# Patient Record
Sex: FEMALE | Race: OTHER | NOT HISPANIC OR LATINO
[De-identification: names, ages, dates, MRNs, and addresses within clinical notes are randomized per-mention and may not be internally consistent; named-entity substitution may affect disease eponyms.]

---

## 2023-08-09 ENCOUNTER — NON-APPOINTMENT (OUTPATIENT)
Age: 33
End: 2023-08-09

## 2023-10-30 ENCOUNTER — EMERGENCY (EMERGENCY)
Facility: HOSPITAL | Age: 33
LOS: 1 days | Discharge: HOME | End: 2023-10-30
Attending: STUDENT IN AN ORGANIZED HEALTH CARE EDUCATION/TRAINING PROGRAM
Payer: COMMERCIAL

## 2023-10-30 VITALS
DIASTOLIC BLOOD PRESSURE: 79 MMHG | WEIGHT: 125 LBS | HEIGHT: 65 IN | SYSTOLIC BLOOD PRESSURE: 118 MMHG | OXYGEN SATURATION: 99 % | RESPIRATION RATE: 18 BRPM | TEMPERATURE: 98 F | HEART RATE: 78 BPM

## 2023-10-30 LAB
RAPID RVP RESULT: SIGNIFICANT CHANGE UP
RAPID RVP RESULT: SIGNIFICANT CHANGE UP
SARS-COV-2 RNA SPEC QL NAA+PROBE: SIGNIFICANT CHANGE UP
SARS-COV-2 RNA SPEC QL NAA+PROBE: SIGNIFICANT CHANGE UP

## 2023-10-30 PROCEDURE — 99284 EMERGENCY DEPT VISIT MOD MDM: CPT

## 2023-10-30 PROCEDURE — 99283 EMERGENCY DEPT VISIT LOW MDM: CPT

## 2023-10-30 PROCEDURE — 0225U NFCT DS DNA&RNA 21 SARSCOV2: CPT

## 2023-10-30 RX ORDER — POLYMYXIN B SULF/TRIMETHOPRIM 10000-1/ML
1 DROPS OPHTHALMIC (EYE)
Qty: 1 | Refills: 0
Start: 2023-10-30 | End: 2023-11-05

## 2023-10-30 NOTE — ED PROVIDER NOTE - NSDCPRINTRESULTS_ED_ALL_ED
Relevant Problems   No relevant active problems       Anesthetic History   No history of anesthetic complications            Review of Systems / Medical History  Patient summary reviewed, nursing notes reviewed and pertinent labs reviewed    Pulmonary          Smoker      Comments: Pulmonary nodule, right (R91.1)   Neuro/Psych             Comments: Peripheral neuropathy (G62.9) Cardiovascular    Hypertension          Hyperlipidemia    Exercise tolerance: >4 METS     GI/Hepatic/Renal     GERD: well controlled           Endo/Other  Within defined limits           Other Findings              Physical Exam    Airway  Mallampati: I  TM Distance: > 6 cm  Neck ROM: normal range of motion   Mouth opening: Normal     Cardiovascular  Regular rate and rhythm,  S1 and S2 normal,  no murmur, click, rub, or gallop  Rhythm: regular  Rate: normal         Dental    Dentition: Caps/crowns     Pulmonary  Breath sounds clear to auscultation               Abdominal  GI exam deferred       Other Findings            Anesthetic Plan    ASA: 2  Anesthesia type: general          Induction: Intravenous  Anesthetic plan and risks discussed with: Patient
Patient requests all Lab, Cardiology, and Radiology Results on their Discharge Instructions

## 2023-10-30 NOTE — ED PROVIDER NOTE - OBJECTIVE STATEMENT
33-year-old female no medical hx presenting with bilateral eye redness for the past day. Notes that her son was sick for the past few days with a viral infection. He coughed in her face last night and this morning she woke up with bilateral red eyes, with yellow discharge from left eye. Denies visual changes. Does not wear contact lenses. Denies fevers, chills, or any other symptoms.

## 2023-10-30 NOTE — ED PROVIDER NOTE - PATIENT PORTAL LINK FT
You can access the FollowMyHealth Patient Portal offered by [facility] by registering at the following website: [email or web address]. By joining [facility]’s FollowMyHealth portal, you will also be able to view your health information using other applications (apps) compatible with our system.

## 2023-10-30 NOTE — ED ADULT TRIAGE NOTE - TEMPERATURE IN FAHRENHEIT (DEGREES F)
1105: Report received from Bryan louise, assumed care of patient at this time. 1230: Spoke with  regarding POC, verbal orders to remove cervidil at 0120, perform SVE, if dilated may begin pitocin, if not much change in cervix orders for 25mcg cytotec buccal Q4H.     0120: Cervidil removed, SVE 0//-3 by this nurse. Plan to start cytotec.    0715: Bedside shift change report given to MICHELLE Ramon RN (oncoming nurse) by STONE Dalton RN (offgoing nurse). Report included the following information SBAR. 98.4

## 2023-10-30 NOTE — ED PROVIDER NOTE - CLINICAL SUMMARY MEDICAL DECISION MAKING FREE TEXT BOX
33-year-old female no medical hx presenting with bilateral eye redness for the past day. RVP sent. Will send rx for polytrim eye drops although suspect that this is likley viral conjunctivitis. Hand hygiene recommendations provided.

## 2023-10-30 NOTE — ED PROVIDER NOTE - NSFOLLOWUPINSTRUCTIONS_ED_ALL_ED_FT
You were seen in the emergency department for: eye redness  Your diagnosis for this visit was: conjunctivitis, likely viral  From this ED visit you were prescribed: polytrim eye drops  We recommend you follow up with: your primary care doctor    Please return to the Emergency Department if you experience any of the following symptoms:   - Shortness of breath or trouble breathing  - Pressure, pain or tightness in the chest  - Face drooping, arm weakness or speech difficulty  - Persistence of severe vomiting  - Head injury or loss of consciousness  - Nonstop bleeding or an open wound    (1) Follow up with your primary care physician within the next 24-48 hours as discussed. In addition, we did not find evidence of a life threatening illness on your testing here today, but listed below are the specialists that will be necessary to see as an outpatient to continue the workup.  Please call the numbers listed below or [phone] to set up the necessary appointments.  (2) Take Tylenol (up to 1000mg or 1 g)  and/or Motrin (up to 600mg) up to every 6 hours as needed for pain.   (3) If you had an IV (intravenous) line placed, it was removed. Sometimes, after IV removal, that area can be tender for a few days; if it develops redness and swelling, those could be signs of infection; in which case, return to the Emergency Department for assessment.  (4) Please continue taking all of your home medications as directed.

## 2023-10-30 NOTE — ED ADULT NURSE NOTE - ISOLATION TYPE:
Discharge instructions including 1 prescription reviewed with pt. Pt verbalized understanding. VSS, PIV removed. Pt ambulatory to exit in stable condition.      Katalina Jackson RN  09/22/22 [id number] None

## 2023-10-30 NOTE — ED PROVIDER NOTE - ENMT, MLM
Airway patent, Nasal mucosa clear. Mouth with normal mucosa. Throat has no vesicles, no oropharyngeal exudates and uvula is midline. +bilateral conjunctival injection

## 2023-10-30 NOTE — ED PROVIDER NOTE - HIV OFFER
Hip joint injection can be repeated after 3- 4 months time and she will need to be seen in the office prior to procedure for re-evaluation and updated plan of care. Opt out

## 2023-11-07 ENCOUNTER — EMERGENCY (EMERGENCY)
Facility: HOSPITAL | Age: 33
LOS: 1 days | Discharge: HOME | End: 2023-11-07
Attending: EMERGENCY MEDICINE
Payer: COMMERCIAL

## 2023-11-07 VITALS
HEIGHT: 65 IN | WEIGHT: 123.9 LBS | RESPIRATION RATE: 18 BRPM | TEMPERATURE: 98 F | SYSTOLIC BLOOD PRESSURE: 102 MMHG | DIASTOLIC BLOOD PRESSURE: 73 MMHG | HEART RATE: 89 BPM | OXYGEN SATURATION: 99 %

## 2023-11-07 LAB
RAPID RVP RESULT: DETECTED
RAPID RVP RESULT: DETECTED
RV+EV RNA SPEC QL NAA+PROBE: DETECTED
RV+EV RNA SPEC QL NAA+PROBE: DETECTED
SARS-COV-2 RNA SPEC QL NAA+PROBE: SIGNIFICANT CHANGE UP
SARS-COV-2 RNA SPEC QL NAA+PROBE: SIGNIFICANT CHANGE UP

## 2023-11-07 PROCEDURE — 99283 EMERGENCY DEPT VISIT LOW MDM: CPT

## 2023-11-07 PROCEDURE — 99284 EMERGENCY DEPT VISIT MOD MDM: CPT

## 2023-11-07 PROCEDURE — 0225U NFCT DS DNA&RNA 21 SARSCOV2: CPT

## 2023-11-07 RX ORDER — CYCLOBENZAPRINE HYDROCHLORIDE 10 MG/1
5 TABLET, FILM COATED ORAL ONCE
Refills: 0 | Status: COMPLETED | OUTPATIENT
Start: 2023-11-07 | End: 2023-11-07

## 2023-11-07 RX ORDER — CYCLOBENZAPRINE HYDROCHLORIDE 10 MG/1
1 TABLET, FILM COATED ORAL
Qty: 9 | Refills: 0
Start: 2023-11-07 | End: 2023-11-09

## 2023-11-07 RX ADMIN — CYCLOBENZAPRINE HYDROCHLORIDE 5 MILLIGRAM(S): 10 TABLET, FILM COATED ORAL at 20:58

## 2023-11-07 NOTE — ED PROVIDER NOTE - PATIENT PORTAL LINK FT
You can access the FollowMyHealth Patient Portal offered by [facility] by registering at the following website: [email or web address]. By joining [facility]’s FollowMyHealth portal, you will also be able to view your health information using other applications (apps) compatible with our system.

## 2023-11-07 NOTE — ED PROVIDER NOTE - NSFOLLOWUPINSTRUCTIONS_ED_ALL_ED_FT
Please call your primary doctor to inform them of this ER visit and obtain the next available appointment within the next 5 days. As we discussed, return to the ER if you have any worsening symptoms. - Bring copies of your results.    We no longer feel that you need further emergency care or admission to the hospital at this time.    While we have determined that you are currently stable for discharge, we know that things can change. Please seek immediate medical attention or return to the ER if you experience any of the following:  Any worsening or persistent symptoms  Severe Pain  Chest Pain  Difficulty Breathing  Bleeding  Passing Out  Severe Rash  Inability to Eat or Drink  Persistent Fever    Please see a primary care doctor or specialist within 5 days to ensure that you are improving.    Please call the [facility] phone numbers on this document if you have any problems obtaining a follow up appointment.

## 2023-11-07 NOTE — ED PROVIDER NOTE - OBJECTIVE STATEMENT
33 year old female with a PHMx of  presents for left TMJ area pain and congestion. Patient states congestion has been for 2 days and pain by TMJ stated today. Patient also states she has pain to left side of neck area to lymph nodes which got better after taking Advil cold and sinus. No fevers, no chills, no chest pain, no shortness of breath, no dizziness, no headache, no visual changes. NKDA.

## 2023-11-07 NOTE — ED PROVIDER NOTE - ATTENDING APP SHARED VISIT CONTRIBUTION OF CARE
seen with acp  c/o head congestion and localized tenderness over jaw  pe positive left tmj tenderness  rvp is wnl  likely tmj syndrome  will start on nsaid  agree with acps assessment hx and physical and disposition

## 2023-11-07 NOTE — ED PROVIDER NOTE - NSICDXPASTMEDICALHX_GEN_ALL_CORE_FT
Problem: Restraint for Non-Violent/Non-Self-Destructive Behavior  Goal: Prevent/Manage Potential Problems  Maintain safety of patient and others during period of restraint.  Promote psychological and physical wellbeing.  Prevent injury to skin and involved body parts.  Promote nutrition, hydration, and elimination.  Outcome: No Change  D/I:  Vented and sedated on propofol.  Did not follow commands or nod head to question, patient also has a history of pulling out lines/tubes per patient's wife, so wrist restraints were applied last night in addition to the unsecured mitts that were already on. Reoriented frequently.  Reassurance given frequently.  A:  Due to high risk for self extubation restraints are still necessary.  P: Continue to monitor need for restraints.  See flowsheets for details.       PAST MEDICAL HISTORY:  H/O:

## 2023-11-07 NOTE — ED ADULT NURSE NOTE - NS ED NURSE LEVEL OF CONSCIOUSNESS MENTAL STATUS
Nutrition Care Plan    Nutrition Diagnosis:   Inadequate intake related to poor appetite and abdominal pain with inability to meet increased nutrient needs for healing of wound to LLE s/p multiple recent, extensive debridements and skin graft as evidenced by estimated nutrient needs to support healing and pt consuming % of mostly small meals since advancement to solid diet 8/17.      Intervention:  General/healthful diet:    Recommend to continue Consistent Carbohydrate Moderate, 2 Gram Sodium diet at this time.  If pt consistently consuming 50% or less of meals, recommend to liberalize diet by eliminating 2 Gram Sodium restriction.    --Staff to encourage meal intakes of >50% as tolerated and encourage pt to consume at least one protein-rich food at each meal.     Commercial beverage:    Per discussion with wife, will start trial of 4oz nutritional shake TID with meals.   --Staff to encourage intake and offer with medications when appropriate.      Monitoring and Evaluation:  Amount of food:    Goal is for pt to consistently consume/tolerate >50% of most meals and >75% of most oral supplements.    --Per wife, pt ate 50% of banana and 100% of an egg and pudding so far today.  Intakes of % recorded at two meals yesterday.         Awake/Alert/Cooperative

## 2023-11-07 NOTE — ED PROVIDER NOTE - CLINICAL SUMMARY MEDICAL DECISION MAKING FREE TEXT BOX
33 year old female with congestion and pain by TMJ, will do RVP. Patient will follow-up with PCP. Return precautions provided.

## 2023-11-07 NOTE — ED ADULT NURSE NOTE - NSFALLUNIVINTERV_ED_ALL_ED
Call bell, personal items and telephone in reach/Instruct patient to call for assistance before getting out of bed/chair/stretcher/Non-slip footwear applied when patient is off stretcher/[place] to call system/Physically safe environment - no spills, clutter or unnecessary equipment/Purposeful proactive rounding/Room/bathroom lighting operational, light cord in reach

## 2023-11-07 NOTE — ED PROVIDER NOTE - CHPI ED SYMPTOMS NEG
no chest pain, no shortness of breath, no dizziness, no headache, no visual changes/no fever/no chills

## 2024-02-12 PROBLEM — Z98.891 HISTORY OF UTERINE SCAR FROM PREVIOUS SURGERY: Chronic | Status: ACTIVE | Noted: 2023-11-07

## 2024-02-13 ENCOUNTER — OUTPATIENT (OUTPATIENT)
Dept: OUTPATIENT SERVICES | Facility: HOSPITAL | Age: 34
LOS: 1 days | End: 2024-02-13
Payer: COMMERCIAL

## 2024-02-13 ENCOUNTER — APPOINTMENT (OUTPATIENT)
Dept: MRI IMAGING | Facility: HOSPITAL | Age: 34
End: 2024-02-13
Payer: COMMERCIAL

## 2024-02-13 DIAGNOSIS — G44.52 NEW DAILY PERSISTENT HEADACHE (NDPH): ICD-10-CM

## 2024-02-13 PROCEDURE — 70551 MRI BRAIN STEM W/O DYE: CPT | Mod: 26

## 2024-02-13 PROCEDURE — 70551 MRI BRAIN STEM W/O DYE: CPT

## 2024-09-02 ENCOUNTER — EMERGENCY (EMERGENCY)
Facility: HOSPITAL | Age: 34
LOS: 1 days | Discharge: HOME | End: 2024-09-02
Attending: EMERGENCY MEDICINE
Payer: COMMERCIAL

## 2024-09-02 VITALS
DIASTOLIC BLOOD PRESSURE: 80 MMHG | OXYGEN SATURATION: 98 % | SYSTOLIC BLOOD PRESSURE: 125 MMHG | RESPIRATION RATE: 17 BRPM | TEMPERATURE: 100 F | HEART RATE: 90 BPM | WEIGHT: 123.46 LBS

## 2024-09-02 LAB — SARS-COV-2 RNA SPEC QL NAA+PROBE: SIGNIFICANT CHANGE UP

## 2024-09-02 PROCEDURE — 99283 EMERGENCY DEPT VISIT LOW MDM: CPT

## 2024-09-02 PROCEDURE — 87635 SARS-COV-2 COVID-19 AMP PRB: CPT

## 2024-09-02 RX ORDER — IBUPROFEN 600 MG
400 TABLET ORAL ONCE
Refills: 0 | Status: COMPLETED | OUTPATIENT
Start: 2024-09-02 | End: 2024-09-02

## 2024-09-02 RX ADMIN — Medication 400 MILLIGRAM(S): at 20:44

## 2024-09-02 NOTE — ED PROVIDER NOTE - OBJECTIVE STATEMENT
34-year-old female who presents with sore throat, muscle aches, chills that began today.  + COVID contact.

## 2024-09-02 NOTE — ED ADULT NURSE NOTE - OBJECTIVE STATEMENT
Patient presented to the ED complaining of nausea, body aches, chills and cold symptoms since today and wants covid test.

## 2024-09-02 NOTE — ED ADULT NURSE NOTE - NSFALLUNIVINTERV_ED_ALL_ED
Bed/Stretcher in lowest position, wheels locked, appropriate side rails in place/Call bell, personal items and telephone in reach/Instruct patient to call for assistance before getting out of bed/chair/stretcher/Non-slip footwear applied when patient is off stretcher/[place] to call system/Physically safe environment - no spills, clutter or unnecessary equipment/Purposeful proactive rounding/Room/bathroom lighting operational, light cord in reach

## 2024-09-02 NOTE — ED PROVIDER NOTE - CLINICAL SUMMARY MEDICAL DECISION MAKING FREE TEXT BOX
34-year-old female who presented with sore throat, myalgias, chills that began today.    Exam as above.  Positive COVID contacts.  COVID test negative.
Continue Regimen: Clindamycin & Benzo Peroxide 1.2/5% Gel QAM
Detail Level: Zone
Otc Regimen: Eucerin hand cream
Continue Regimen: Triamcinolone

## 2024-09-02 NOTE — ED PROVIDER NOTE - PHYSICAL EXAMINATION
general: Patient well appearing, vital signs within normal limits  HEENT: MMM, trachea midline, uvula midline, no pharyngeal erythema/edema/exudate  Respiratory: No respiratory distress  Neuro: Moves all extremities  Skin: No rashes or lesions

## 2024-09-02 NOTE — ED PROVIDER NOTE - PATIENT PORTAL LINK FT
You can access the FollowMyHealth Patient Portal offered by [facility] by registering at the following website: [email or web address]. By joining [facility]’s FollowMyHealth portal, you will also be able to view your health information using other applications (apps) compatible with our system.

## 2024-10-18 PROBLEM — Z00.00 ENCOUNTER FOR PREVENTIVE HEALTH EXAMINATION: Status: ACTIVE | Noted: 2024-10-18

## 2024-10-19 ENCOUNTER — APPOINTMENT (OUTPATIENT)
Dept: ULTRASOUND IMAGING | Facility: CLINIC | Age: 34
End: 2024-10-19

## 2025-03-23 ENCOUNTER — NON-APPOINTMENT (OUTPATIENT)
Age: 35
End: 2025-03-23

## 2025-03-28 ENCOUNTER — EMERGENCY (EMERGENCY)
Facility: HOSPITAL | Age: 35
LOS: 1 days | Discharge: HOME | End: 2025-03-28
Attending: STUDENT IN AN ORGANIZED HEALTH CARE EDUCATION/TRAINING PROGRAM
Payer: COMMERCIAL

## 2025-03-28 VITALS
SYSTOLIC BLOOD PRESSURE: 132 MMHG | HEIGHT: 65 IN | TEMPERATURE: 98 F | DIASTOLIC BLOOD PRESSURE: 75 MMHG | OXYGEN SATURATION: 99 % | WEIGHT: 130.73 LBS | RESPIRATION RATE: 16 BRPM | HEART RATE: 84 BPM

## 2025-03-28 PROCEDURE — 99283 EMERGENCY DEPT VISIT LOW MDM: CPT

## 2025-03-28 RX ORDER — CEFUROXIME SODIUM 1.5 G
1 VIAL (EA) INJECTION
Qty: 14 | Refills: 0
Start: 2025-03-28 | End: 2025-04-03

## 2025-03-28 RX ORDER — FLUCONAZOLE 150 MG
1 TABLET ORAL
Qty: 2 | Refills: 0
Start: 2025-03-28 | End: 2025-03-29

## 2025-03-28 NOTE — ED PROVIDER NOTE - CLINICAL SUMMARY MEDICAL DECISION MAKING FREE TEXT BOX
34-year-old female with a past medical history of  presents with dysuria.  Patient reports that on  she had dysuria and urgency.  She was prescribed Cipro without urinalysis or urine culture being performed.  Patient reports her symptoms initially improved but then on  she began having urinary frequency and dysuria again.  Followed up with urgent care where they started her on Macrobid.  Patient received a phone call from them today saying she tested positive for E. coli and group B strep and that the Macrobid would not treat her UTI.  They recommended that since she still is having dysuria she should  go to the emergency room for antibiotics.  Patient denies fevers, flank pain.    Reviewed urine culture from urgent care.  Patient sensitive to cefuroxime mean.  Will send to the pharmacy.  Will also send Diflucan in case she develops a vaginal yeast infection from so many different antibiotics.

## 2025-03-28 NOTE — ED PROVIDER NOTE - ATTENDING APP SHARED VISIT CONTRIBUTION OF CARE
Patient presented dysuria started on Macrobid otherwise abdomen soft no peritoneal patient is clinically well does not appear septic no flank pain to suggest kidney stone   will change antibiotics instructed PMD follow-up return precautions

## 2025-03-28 NOTE — ED PROVIDER NOTE - PATIENT PORTAL LINK FT
You can access the FollowMyHealth Patient Portal offered by [facility] by registering at the following website: [email or web address]. By joining [facility]’s FollowMyHealth portal, you will also be able to view your health information using other applications (apps) compatible with our system.

## 2025-03-28 NOTE — ED PROVIDER NOTE - OBJECTIVE STATEMENT
34-year-old female with a past medical history of  presents with dysuria.  Patient reports that on  she had dysuria and urgency.  She was prescribed Cipro without urinalysis or urine culture being performed.  Patient reports her symptoms initially improved but then on  she began having urinary frequency and dysuria again.  Followed up with urgent care where they started her on Macrobid.  Patient received a phone call from them today saying she tested positive for E. coli and group B strep and that the Macrobid would not treat her UTI.  They recommended that since she still is having dysuria she should  go to the emergency room for antibiotics.  Patient denies fevers, flank pain.

## 2025-03-28 NOTE — ED PROVIDER NOTE - NSFOLLOWUPINSTRUCTIONS_ED_ALL_ED_FT
Follow-up with your primary care doctor in 1 week.    Antibiotics sent to the pharmacy for you.  Take as directed and until all the medication is completed, even if you are feeling better.    If you develop symptoms of a vaginal yeast  infection such as vaginal itching, copious white discharge you can take the Diflucan.  You take 1 pill and if you continue to have symptoms 72 hours later you can take the second pill.    You can take Motrin (ibuprofen) 600 mg every 6 hours or Tylenol (acetaminophen) 1000 mg every 6 hours as needed for pain or fever.     If you experience any new or worsening symptoms or if you are concerned you can always come back to the emergency for a re-evaluation.

## 2025-03-28 NOTE — ED ADULT NURSE NOTE - OBJECTIVE STATEMENT
Patient presents to ED with c/o urinary symptoms for about 2 weeks and was started on Cipro. Patient also endorsed on 3/28 she started having urinary urgency and burning with urination, was seen at urgent care and was prescribed Macrobid, yesterday she was informed that her urine culture was tested positive for E-coli and referred to the ED for antibiotic. Patient denies abdominal pain and nausea and vomiting. No distress noted.

## 2025-03-28 NOTE — ED ADULT NURSE NOTE - CAS DISCH CONDITION
Chief complaint:   Chief Complaint   Patient presents with   • Pre-Op Exam       Vitals:  Visit Vitals  /68   Pulse 74   Ht 5' 3\" (1.6 m)   Wt 57 kg   LMP 02/27/2018 (Exact Date)   BMI 22.26 kg/m²       HISTORY OF PRESENT ILLNESS     CC; Pelvic mass  Patient's last menstrual period was 02/27/2018 (exact date).   Is on ocp, will stop today.  Scheduled for laparotomy with possible ovarian cystectomy, possible oophorectomy, possible myomectomy for a persistent pelvic mass.  Pt saw Dr Lira yesterday who feels, based on imaging, that it likely originates from the ovary.  Urinary urgency started 2-3 months ago.   She saw her PCP Rosalva Patino and had neg UPT on 2/14.  UA showed small blood, otherwise negative.  BV diagnosed at that time, s/p flagyl.  Neg GC and chlam testing then.  Normal pap 11/7/17.  Pelvic u/s done 2/16/18  IMPRESSION:  Large complex mass lesion just above the uterus potentially a pedunculated  fibroid. This however is unclear. MR scanning might be helpful in further  evaluation.  The ovaries are separate from the above lesion and are normal in  appearance.     Pelvic MRI done 3/8/18:  IMPRESSION:     1. Large cystic mass within the pelvis appears to emanate from the fundus  of the uterus. This has the appearance of subserosal leiomyoma with cystic  degeneration. Leiomyosarcoma can also have this appearance, however, and  there is small adjacent ascites. The mass abuts several structures, but  there is no appreciable invasion and no lymphadenopathy. Recommend  consultation with gynecologic oncology regarding surgical resection.     2. The ovaries appear within normal limits. The right ovary abuts the mass  but does not appear contiguous with it.      Proceeding with laparotomy in an attempt to avoid rupture of the mass.  Likely will also result in a more cosmetic incision in this thin patient (likely would need to make one of her laparoscopy incisions larger to remove the mass).   Discussed  that we will remove the mass and send for permanent pathology to ensure accuracy and appropriate plan in this young patient. Discussed small chance of malignancy and chance for needing another surgery or therapy in the future.    The risks of surgery were explained to the patient; including but not limited to: Damage to her bowel, bladder or blood vessels, possible blood transfusion, bowel or bladder surgery, infection, future surgery, failure of the procedure, anesthetic complications and death.  She accepts these and all other risks and wishes to proceed.         Other significant problems:  Patient Active Problem List    Diagnosis Date Noted   • Pelvic mass 03/12/2018     Priority: Low       PAST MEDICAL, FAMILY AND SOCIAL HISTORY     Medications:  Current Outpatient Prescriptions   Medication   • metroNIDAZOLE (FLAGYL) 500 MG tablet   • norgestimate-ethinyl estradiol, triphasic, 0.18/0.215/0.25 MG-35 MCG tablet     No current facility-administered medications for this visit.        Allergies:  ALLERGIES:  No Known Allergies    Past Medical  History/Surgeries:  Past Medical History:   Diagnosis Date   • Pelvic mass in female        Past Surgical History:   Procedure Laterality Date   • [place] tooth extraction         Family History:  Family History   Problem Relation Age of Onset   • Diabetes Mother    • Gastrointestinal Mother      gallstones   • Arthritis Father    • High cholesterol Father    • Diabetes Maternal Grandmother    • Hypertension Maternal Grandmother    • Hypertension Maternal Aunt        Social History:  Social History   Substance Use Topics   • Smoking status: Never Smoker   • Smokeless tobacco: Never Used   • Alcohol use No       REVIEW OF SYSTEMS     Review of Systems   Constitutional: Negative.    HENT: Negative.    Eyes: Negative.    Cardiovascular: Negative.    Gastrointestinal: Negative.    Endocrine: Negative.    Genitourinary: Positive for urgency.   Musculoskeletal: Negative.     Allergic/Immunologic: Negative.    Neurological: Negative.    Hematological: Negative.    Psychiatric/Behavioral: Negative.        PHYSICAL EXAM     Visit Vitals  /68   Pulse 74   Ht 5' 3\" (1.6 m)   Wt 57 kg   LMP 02/27/2018 (Exact Date)   BMI 22.26 kg/m²     Physical Exam   Constitutional: She is oriented to person, place, and time. She appears well-developed and well-nourished.   HENT:   Head: Normocephalic and atraumatic.   Eyes: Conjunctivae are normal.   Neck: Normal range of motion. Neck supple. No tracheal deviation present. No thyromegaly present.   Cardiovascular: Normal rate, regular rhythm and normal heart sounds.  Exam reveals no friction rub.    No murmur heard.  Pulmonary/Chest: Effort normal and breath sounds normal. No respiratory distress. She has no wheezes. She has no rales.   Abdominal: Soft. Bowel sounds are normal. She exhibits mass.   Suprapubic mass palpable to 3-4 cm above the symphysis pubis   Musculoskeletal: Normal range of motion. She exhibits no edema.   Neurological: She is alert and oriented to person, place, and time.   Skin: Skin is warm and dry.   Psychiatric: She has a normal mood and affect. Her behavior is normal. Judgment and thought content normal.       ASSESSMENT/PLAN     Persistent pelvic mass. S/p GYN ONC consultation with Dr Lira. Will proceed with laparotomy, possible ovarian cystectomy, possible oophorectomy, possible myomectomy.  Discussed risks of procedure and possible need for future surgery. Discussed +- of frozen section versus only permanent with Dr Lira. To ensure accuracy, plan on permanent section. Dr Lira is aware and on standby for the surgery.   Given the scrub today.   Melisa Stephens MD    Stable